# Patient Record
Sex: MALE | HISPANIC OR LATINO | Employment: FULL TIME | URBAN - METROPOLITAN AREA
[De-identification: names, ages, dates, MRNs, and addresses within clinical notes are randomized per-mention and may not be internally consistent; named-entity substitution may affect disease eponyms.]

---

## 2020-02-26 PROBLEM — H81.10 BENIGN PAROXYSMAL POSITIONAL VERTIGO, UNSPECIFIED LATERALITY: Status: ACTIVE | Noted: 2020-02-26

## 2022-10-27 ENCOUNTER — OFFICE VISIT (OUTPATIENT)
Dept: INTERNAL MEDICINE | Age: 42
End: 2022-10-27

## 2022-10-27 VITALS
RESPIRATION RATE: 16 BRPM | HEIGHT: 70 IN | OXYGEN SATURATION: 97 % | SYSTOLIC BLOOD PRESSURE: 133 MMHG | HEART RATE: 96 BPM | BODY MASS INDEX: 30.37 KG/M2 | WEIGHT: 212.1 LBS | DIASTOLIC BLOOD PRESSURE: 86 MMHG | TEMPERATURE: 98.1 F

## 2022-10-27 DIAGNOSIS — E66.9 OBESITY (BMI 30.0-34.9): ICD-10-CM

## 2022-10-27 DIAGNOSIS — Z23 NEED FOR VACCINATION: ICD-10-CM

## 2022-10-27 DIAGNOSIS — D72.819 LEUKOPENIA, UNSPECIFIED TYPE: ICD-10-CM

## 2022-10-27 DIAGNOSIS — Z00.00 ANNUAL PHYSICAL EXAM: Primary | ICD-10-CM

## 2022-10-27 DIAGNOSIS — E78.2 HYPERLIPIDEMIA, MIXED: ICD-10-CM

## 2022-10-27 PROCEDURE — 99386 PREV VISIT NEW AGE 40-64: CPT | Performed by: INTERNAL MEDICINE

## 2022-10-27 PROCEDURE — 90471 IMMUNIZATION ADMIN: CPT | Performed by: INTERNAL MEDICINE

## 2022-10-27 PROCEDURE — 90686 IIV4 VACC NO PRSV 0.5 ML IM: CPT | Performed by: INTERNAL MEDICINE

## 2022-10-27 ASSESSMENT — ENCOUNTER SYMPTOMS
LIGHT-HEADEDNESS: 0
ABDOMINAL PAIN: 0
NERVOUS/ANXIOUS: 0
CONSTIPATION: 0
SHORTNESS OF BREATH: 0
DIARRHEA: 0
NAUSEA: 0
FATIGUE: 0
COUGH: 0
WHEEZING: 0
EYE DISCHARGE: 0
ABDOMINAL DISTENTION: 0
COLOR CHANGE: 0
TROUBLE SWALLOWING: 0
WEAKNESS: 0
SORE THROAT: 0
HEADACHES: 0
NUMBNESS: 0
EYE PAIN: 0
UNEXPECTED WEIGHT CHANGE: 0
CHEST TIGHTNESS: 0
SINUS PRESSURE: 0
APPETITE CHANGE: 0
FEVER: 0
DIZZINESS: 0
EYE REDNESS: 0
BACK PAIN: 0
BLOOD IN STOOL: 0

## 2022-10-27 ASSESSMENT — PATIENT HEALTH QUESTIONNAIRE - PHQ9
CLINICAL INTERPRETATION OF PHQ2 SCORE: NO FURTHER SCREENING NEEDED
SUM OF ALL RESPONSES TO PHQ9 QUESTIONS 1 AND 2: 0
SUM OF ALL RESPONSES TO PHQ9 QUESTIONS 1 AND 2: 0
2. FEELING DOWN, DEPRESSED OR HOPELESS: NOT AT ALL
1. LITTLE INTEREST OR PLEASURE IN DOING THINGS: NOT AT ALL

## 2022-10-29 ENCOUNTER — LAB SERVICES (OUTPATIENT)
Dept: LAB | Age: 42
End: 2022-10-29

## 2022-10-29 DIAGNOSIS — E66.9 OBESITY (BMI 30.0-34.9): ICD-10-CM

## 2022-10-29 DIAGNOSIS — Z00.00 ANNUAL PHYSICAL EXAM: ICD-10-CM

## 2022-10-29 LAB
ALBUMIN SERPL-MCNC: 4.5 G/DL (ref 3.6–5.1)
ALBUMIN/GLOB SERPL: 1.7 {RATIO} (ref 1–2.4)
ALP SERPL-CCNC: 51 UNITS/L (ref 45–117)
ALT SERPL-CCNC: 64 UNITS/L
ANION GAP SERPL CALC-SCNC: 7 MMOL/L (ref 7–19)
AST SERPL-CCNC: 28 UNITS/L
BASOPHILS # BLD: 0 K/MCL (ref 0–0.3)
BASOPHILS NFR BLD: 1 %
BILIRUB SERPL-MCNC: 1.3 MG/DL (ref 0.2–1)
BUN SERPL-MCNC: 18 MG/DL (ref 6–20)
BUN/CREAT SERPL: 16 (ref 7–25)
CALCIUM SERPL-MCNC: 9 MG/DL (ref 8.4–10.2)
CHLORIDE SERPL-SCNC: 105 MMOL/L (ref 97–110)
CHOLEST SERPL-MCNC: 200 MG/DL
CHOLEST/HDLC SERPL: 4.8 {RATIO}
CO2 SERPL-SCNC: 31 MMOL/L (ref 21–32)
CREAT SERPL-MCNC: 1.11 MG/DL (ref 0.67–1.17)
DEPRECATED RDW RBC: 40.4 FL (ref 39–50)
EOSINOPHIL # BLD: 0.2 K/MCL (ref 0–0.5)
EOSINOPHIL NFR BLD: 5 %
ERYTHROCYTE [DISTWIDTH] IN BLOOD: 12.2 % (ref 11–15)
FASTING DURATION TIME PATIENT: ABNORMAL H
FASTING DURATION TIME PATIENT: ABNORMAL H
GFR SERPLBLD BASED ON 1.73 SQ M-ARVRAT: 85 ML/MIN
GLOBULIN SER-MCNC: 2.7 G/DL (ref 2–4)
GLUCOSE SERPL-MCNC: 95 MG/DL (ref 70–99)
HCT VFR BLD CALC: 46.9 % (ref 39–51)
HDLC SERPL-MCNC: 42 MG/DL
HGB BLD-MCNC: 16.3 G/DL (ref 13–17)
IMM GRANULOCYTES # BLD AUTO: 0 K/MCL (ref 0–0.2)
IMM GRANULOCYTES # BLD: 0 %
LDLC SERPL CALC-MCNC: 137 MG/DL
LYMPHOCYTES # BLD: 1.2 K/MCL (ref 1–4.8)
LYMPHOCYTES NFR BLD: 31 %
MCH RBC QN AUTO: 31.3 PG (ref 26–34)
MCHC RBC AUTO-ENTMCNC: 34.8 G/DL (ref 32–36.5)
MCV RBC AUTO: 90.2 FL (ref 78–100)
MONOCYTES # BLD: 0.4 K/MCL (ref 0.3–0.9)
MONOCYTES NFR BLD: 11 %
NEUTROPHILS # BLD: 2 K/MCL (ref 1.8–7.7)
NEUTROPHILS NFR BLD: 52 %
NONHDLC SERPL-MCNC: 158 MG/DL
NRBC BLD MANUAL-RTO: 0 /100 WBC
PLATELET # BLD AUTO: 205 K/MCL (ref 140–450)
POTASSIUM SERPL-SCNC: 4.3 MMOL/L (ref 3.4–5.1)
PROT SERPL-MCNC: 7.2 G/DL (ref 6.4–8.2)
RBC # BLD: 5.2 MIL/MCL (ref 4.5–5.9)
SODIUM SERPL-SCNC: 139 MMOL/L (ref 135–145)
TRIGL SERPL-MCNC: 107 MG/DL
TSH SERPL-ACNC: 1.83 MCUNITS/ML (ref 0.35–5)
WBC # BLD: 3.7 K/MCL (ref 4.2–11)

## 2022-10-29 PROCEDURE — 36415 COLL VENOUS BLD VENIPUNCTURE: CPT | Performed by: INTERNAL MEDICINE

## 2022-10-29 PROCEDURE — 80061 LIPID PANEL: CPT | Performed by: INTERNAL MEDICINE

## 2022-10-29 PROCEDURE — 80050 GENERAL HEALTH PANEL: CPT | Performed by: INTERNAL MEDICINE

## 2022-10-31 PROBLEM — D72.819 LEUKOPENIA: Status: ACTIVE | Noted: 2022-10-31

## 2022-10-31 PROBLEM — E78.2 HYPERLIPIDEMIA, MIXED: Status: ACTIVE | Noted: 2022-10-31

## 2024-02-09 ENCOUNTER — TELEPHONE (OUTPATIENT)
Dept: FAMILY MEDICINE CLINIC | Facility: CLINIC | Age: 44
End: 2024-02-09

## 2024-02-09 ENCOUNTER — LAB ENCOUNTER (OUTPATIENT)
Dept: LAB | Age: 44
End: 2024-02-09
Attending: STUDENT IN AN ORGANIZED HEALTH CARE EDUCATION/TRAINING PROGRAM
Payer: COMMERCIAL

## 2024-02-09 ENCOUNTER — OFFICE VISIT (OUTPATIENT)
Dept: FAMILY MEDICINE CLINIC | Facility: CLINIC | Age: 44
End: 2024-02-09
Payer: COMMERCIAL

## 2024-02-09 VITALS
SYSTOLIC BLOOD PRESSURE: 118 MMHG | TEMPERATURE: 97 F | DIASTOLIC BLOOD PRESSURE: 80 MMHG | HEIGHT: 70.28 IN | RESPIRATION RATE: 16 BRPM | WEIGHT: 214.38 LBS | HEART RATE: 66 BPM | BODY MASS INDEX: 30.35 KG/M2

## 2024-02-09 DIAGNOSIS — Z13.220 SCREENING FOR LIPID DISORDERS: ICD-10-CM

## 2024-02-09 DIAGNOSIS — Z12.5 SCREENING FOR PROSTATE CANCER: ICD-10-CM

## 2024-02-09 DIAGNOSIS — Z00.01 ENCOUNTER FOR ROUTINE ADULT HEALTH EXAMINATION WITH ABNORMAL FINDINGS: Primary | ICD-10-CM

## 2024-02-09 DIAGNOSIS — Z13.89 SCREENING FOR GENITOURINARY CONDITION: ICD-10-CM

## 2024-02-09 DIAGNOSIS — Z12.11 SCREENING FOR MALIGNANT NEOPLASM OF COLON: ICD-10-CM

## 2024-02-09 DIAGNOSIS — Z00.00 LABORATORY EXAMINATION ORDERED AS PART OF A ROUTINE GENERAL MEDICAL EXAMINATION: ICD-10-CM

## 2024-02-09 DIAGNOSIS — Z13.29 SCREENING FOR ENDOCRINE, METABOLIC, AND IMMUNITY DISORDER: ICD-10-CM

## 2024-02-09 DIAGNOSIS — E66.09 CLASS 1 OBESITY DUE TO EXCESS CALORIES WITH BODY MASS INDEX (BMI) OF 30.0 TO 30.9 IN ADULT, UNSPECIFIED WHETHER SERIOUS COMORBIDITY PRESENT: ICD-10-CM

## 2024-02-09 DIAGNOSIS — Z13.0 SCREENING FOR ENDOCRINE, METABOLIC, AND IMMUNITY DISORDER: ICD-10-CM

## 2024-02-09 DIAGNOSIS — Z13.228 SCREENING FOR ENDOCRINE, METABOLIC, AND IMMUNITY DISORDER: ICD-10-CM

## 2024-02-09 DIAGNOSIS — M21.612 BUNION OF LEFT FOOT: ICD-10-CM

## 2024-02-09 PROBLEM — M79.672 FOOT PAIN, LEFT: Status: ACTIVE | Noted: 2023-11-02

## 2024-02-09 PROBLEM — J30.9 ALLERGIC RHINITIS: Status: ACTIVE | Noted: 2018-02-27

## 2024-02-09 PROBLEM — L60.8 TOENAIL DEFORMITY: Status: ACTIVE | Noted: 2018-03-16

## 2024-02-09 PROBLEM — E78.2 HYPERLIPIDEMIA, MIXED: Status: ACTIVE | Noted: 2022-10-31

## 2024-02-09 PROBLEM — E66.9 OBESITY (BMI 30.0-34.9): Status: ACTIVE | Noted: 2018-02-27

## 2024-02-09 PROBLEM — D72.819 LEUKOPENIA: Status: ACTIVE | Noted: 2022-10-31

## 2024-02-09 PROBLEM — G89.29 CHRONIC LOW BACK PAIN: Status: ACTIVE | Noted: 2018-02-27

## 2024-02-09 PROBLEM — E66.811 OBESITY (BMI 30.0-34.9): Status: ACTIVE | Noted: 2018-02-27

## 2024-02-09 PROBLEM — M54.50 CHRONIC LOW BACK PAIN: Status: ACTIVE | Noted: 2018-02-27

## 2024-02-09 PROBLEM — M25.512 ACUTE PAIN OF LEFT SHOULDER: Status: ACTIVE | Noted: 2022-08-12

## 2024-02-09 PROBLEM — E55.9 VITAMIN D DEFICIENCY: Status: ACTIVE | Noted: 2018-03-16

## 2024-02-09 LAB
ALBUMIN SERPL-MCNC: 4.3 G/DL (ref 3.4–5)
ALBUMIN/GLOB SERPL: 1.4 {RATIO} (ref 1–2)
ALP LIVER SERPL-CCNC: 63 U/L
ALT SERPL-CCNC: 40 U/L
ANION GAP SERPL CALC-SCNC: 4 MMOL/L (ref 0–18)
AST SERPL-CCNC: 28 U/L (ref 15–37)
BASOPHILS # BLD AUTO: 0.02 X10(3) UL (ref 0–0.2)
BASOPHILS NFR BLD AUTO: 0.3 %
BILIRUB SERPL-MCNC: 0.8 MG/DL (ref 0.1–2)
BILIRUB UR QL STRIP.AUTO: NEGATIVE
BUN BLD-MCNC: 18 MG/DL (ref 9–23)
CALCIUM BLD-MCNC: 8.9 MG/DL (ref 8.5–10.1)
CHLORIDE SERPL-SCNC: 105 MMOL/L (ref 98–112)
CHOLEST SERPL-MCNC: 198 MG/DL (ref ?–200)
CLARITY UR REFRACT.AUTO: CLEAR
CO2 SERPL-SCNC: 30 MMOL/L (ref 21–32)
COLOR UR AUTO: YELLOW
COMPLEXED PSA SERPL-MCNC: 0.83 NG/ML (ref ?–4)
CREAT BLD-MCNC: 1.2 MG/DL
EGFRCR SERPLBLD CKD-EPI 2021: 76 ML/MIN/1.73M2 (ref 60–?)
EOSINOPHIL # BLD AUTO: 0.21 X10(3) UL (ref 0–0.7)
EOSINOPHIL NFR BLD AUTO: 3.5 %
ERYTHROCYTE [DISTWIDTH] IN BLOOD BY AUTOMATED COUNT: 11.8 %
FASTING PATIENT LIPID ANSWER: NO
FASTING STATUS PATIENT QL REPORTED: NO
GLOBULIN PLAS-MCNC: 3 G/DL (ref 2.8–4.4)
GLUCOSE BLD-MCNC: 86 MG/DL (ref 70–99)
GLUCOSE UR STRIP.AUTO-MCNC: NEGATIVE MG/DL
HCT VFR BLD AUTO: 44.8 %
HDLC SERPL-MCNC: 46 MG/DL (ref 40–59)
HGB BLD-MCNC: 15.4 G/DL
IMM GRANULOCYTES # BLD AUTO: 0.01 X10(3) UL (ref 0–1)
IMM GRANULOCYTES NFR BLD: 0.2 %
KETONES UR STRIP.AUTO-MCNC: NEGATIVE MG/DL
LDLC SERPL CALC-MCNC: 104 MG/DL (ref ?–100)
LEUKOCYTE ESTERASE UR QL STRIP.AUTO: NEGATIVE
LYMPHOCYTES # BLD AUTO: 1.65 X10(3) UL (ref 1–4)
LYMPHOCYTES NFR BLD AUTO: 27.8 %
MCH RBC QN AUTO: 30.3 PG (ref 26–34)
MCHC RBC AUTO-ENTMCNC: 34.4 G/DL (ref 31–37)
MCV RBC AUTO: 88.2 FL
MONOCYTES # BLD AUTO: 0.52 X10(3) UL (ref 0.1–1)
MONOCYTES NFR BLD AUTO: 8.8 %
NEUTROPHILS # BLD AUTO: 3.52 X10 (3) UL (ref 1.5–7.7)
NEUTROPHILS # BLD AUTO: 3.52 X10(3) UL (ref 1.5–7.7)
NEUTROPHILS NFR BLD AUTO: 59.4 %
NITRITE UR QL STRIP.AUTO: NEGATIVE
NONHDLC SERPL-MCNC: 152 MG/DL (ref ?–130)
OSMOLALITY SERPL CALC.SUM OF ELEC: 289 MOSM/KG (ref 275–295)
PH UR STRIP.AUTO: 7.5 [PH] (ref 5–8)
PLATELET # BLD AUTO: 201 10(3)UL (ref 150–450)
POTASSIUM SERPL-SCNC: 4.1 MMOL/L (ref 3.5–5.1)
PROT SERPL-MCNC: 7.3 G/DL (ref 6.4–8.2)
PROT UR STRIP.AUTO-MCNC: NEGATIVE MG/DL
RBC # BLD AUTO: 5.08 X10(6)UL
RBC UR QL AUTO: NEGATIVE
SODIUM SERPL-SCNC: 139 MMOL/L (ref 136–145)
SP GR UR STRIP.AUTO: 1.02 (ref 1–1.03)
TRIGL SERPL-MCNC: 282 MG/DL (ref 30–149)
TSI SER-ACNC: 2.47 MIU/ML (ref 0.36–3.74)
UROBILINOGEN UR STRIP.AUTO-MCNC: 0.2 MG/DL
VLDLC SERPL CALC-MCNC: 48 MG/DL (ref 0–30)
WBC # BLD AUTO: 5.9 X10(3) UL (ref 4–11)

## 2024-02-09 PROCEDURE — 80061 LIPID PANEL: CPT

## 2024-02-09 PROCEDURE — 81003 URINALYSIS AUTO W/O SCOPE: CPT

## 2024-02-09 PROCEDURE — 85025 COMPLETE CBC W/AUTO DIFF WBC: CPT

## 2024-02-09 PROCEDURE — 84443 ASSAY THYROID STIM HORMONE: CPT

## 2024-02-09 PROCEDURE — 80053 COMPREHEN METABOLIC PANEL: CPT

## 2024-02-09 PROCEDURE — 99386 PREV VISIT NEW AGE 40-64: CPT | Performed by: STUDENT IN AN ORGANIZED HEALTH CARE EDUCATION/TRAINING PROGRAM

## 2024-02-09 NOTE — PATIENT INSTRUCTIONS
The ultrafast heart scan also called the calcium score is another tool to determine risk for heart disease. It is a low dose CT scan that looks at calcium deposits in the coronary arteries. You can call [phone] or go online [email or web address] to schedule.

## 2024-02-09 NOTE — PROGRESS NOTES
[facility]  02/09/24    Chief Complaint   Patient presents with    Physical     HPI:   John Abraham is a 43 year old male who presents for a complete physical exam.    Will have foot surgery.    After father passed away 5 years ago, was drinking a lot and then tried cannabis for sleep and it works well.     Had RSV a few months ago.    Fingers will be dry sometimes.    PMH: denied  PSH: denied  Meds: denied  Allergies: PCN and shrimp  Home: lives with wife, son is away at college in [place]  Work: , enjoys job  Habits: Denied alcohol, tobacco, +cannabis for sleep  FHx: diabetes, stroke, heart issues on father's side; mother's side anxiety  Exercise: daily, playing [employer] ball three times a week   Diet: healthy  Immunizations: vaccines up to date        Wt Readings from Last 6 Encounters:   02/09/24 214 lb 6.4 oz (97.3 kg)   02/26/20 220 lb (99.8 kg)     Body mass index is 30.52 kg/m².     Results for orders placed or performed in visit on 02/29/20   Vitamin D, 25-Hydroxy [1516021]   Result Value Ref Range    25-Hydroxyvitamin D (Total) 13.36 (L) 30.00 - 100.00 ng/mL   Hemoglobin A1C [[id number]]   Result Value Ref Range    HbA1c 5.0 4.0 - 5.6 %    Estimated Average Glucose 97 mg/dL   PSA   Result Value Ref Range    PSA 0.644 0.01 - 4 ng/mL   TSH W Reflex to Free T4 [[id number]]   Result Value Ref Range    TSH 1.792 0.350 - 5.500 mIU/L   Lipid Panel   Result Value Ref Range    Patient Fasting? Yes     Triglycerides 100.00 <150.00 mg/dL    Direct HDL 38 (L) >=40 mg/dL    Risk Factor 4.9 <5.0    Cholesterol 187.00 <=200.00 mg/dL    Calculated  <130 mg/dL   ALT(SGPT)   Result Value Ref Range    ALT 84 (H) 17 - 63 U/L   Basic Metabolic Panel (8)   Result Value Ref Range    Patient Fasting? Yes     Sodium 140 136 - 144 mmol/L    Potassium 3.80 3.60 - 5.10 mmol/L    Chloride 102 101 - 111 mmol/L    Carbon Dioxide 30.7 22.0 - 32.0 mmol/L    Blood Urea Nitrogen 12.0 8.0 - 20.0 mg/dL     Creatinine 1.06 0.70 - 1.30 mg/dL    Glucose 90 70 - 99 mg/dL    Calcium 8.8 8.3 - 10.3 mg/dL    GFR CKD-EPI 87.35 >=60.00 mL/min/1.73 m²   CBC, Platelet; No Differential   Result Value Ref Range    WBC 3.52 (L) 4.00 - 13.00 10^3/uL    RBC 5.18 4.30 - 5.70 10^6/uL    Hemoglobin 16.2 13.0 - 17.0 g/dL    Hematocrit 45.6 37.0 - 53.0 %    MCV 88.0 80.0 - 99.0 fL    MCH 31.3 27.0 - 33.2 pg    MCHC 35.5 31.0 - 37.0 g/dL    Platelet Count 186 150 - 450 10^3/uL    RDW 11.7 11.5 - 16.0 %    MPV 9.4 7.0 - 11.5 fL   Vitamin B12   Result Value Ref Range    Vitamin B12 545 211 - 946 pg/mL   Folic Acid Serum (Folate)   Result Value Ref Range    Folate (Folic Acid) 13.39 4.78 - 24.20 ng/mL         No current outpatient medications on file.      Allergies   Allergen Reactions    Penicillins SWELLING and SHORTNESS OF BREATH    Shrimp Extract Allergy Skin Test HIVES      Past Medical History:   Diagnosis Date    Family history of diabetes mellitus     Family history of heart disease     History of motion sickness       History reviewed. No pertinent surgical history.   Family History   Problem Relation Age of Onset    Diabetes Father     Heart Disease Father     Stroke Father       Social History:  Social History     Socioeconomic History    Marital status: Unknown   Tobacco Use    Smoking status: Never    Smokeless tobacco: Never   Vaping Use    Vaping Use: Never used   Substance and Sexual Activity    Alcohol use: Not Currently     Comment: stopped drinking 07/05/19    Drug use: Yes     Types: Cannabis   Other Topics Concern    Caffeine Concern Yes     Comment: 2 c. coffee/daily    Exercise Yes     Comment: strength, pickle ball, cardio         REVIEW OF SYSTEMS:   GENERAL: feels well otherwise  SKIN: denies any unusual skin lesions  EYES:denies blurred vision or double vision  HEENT: denies nasal congestion, sinus pain or ST  LUNGS: denies shortness of breath with exertion, denies cough  CARDIOVASCULAR: denies chest pain on  exertion or at rest, denies palpitations  GI: denies abdominal pain,denies heartburn, denies n/v/d/c/blood in stool  : + 1 nocturia, denies changes in stream  MUSCULOSKELETAL: +chronic back pain  NEURO: denies headaches, denies LH/dizziness/syncope  PSYCHE: denies depression or anxiety  HEMATOLOGIC: denies hx of anemia  ENDOCRINE: denies thyroid history  ALL/ASTHMA: + hx of allergy or asthma    EXAM:   /80 (BP Location: Left arm, Patient Position: Sitting, Cuff Size: large)   Pulse 66   Temp 97 °F (36.1 °C) (Temporal)   Resp 16   Ht 5' 10.28\" (1.785 m)   Wt 214 lb 6.4 oz (97.3 kg)   BMI 30.52 kg/m²   Body mass index is 30.52 kg/m².   GENERAL: well developed, well nourished,in no apparent distress  SKIN: no rashes,no suspicious lesions  HEENT: atraumatic, normocephalic,ears and throat are clear  EYES:PERRLA, EOMI, conjunctiva are clear  NECK: supple,no adenopathy,no bruits, no thyromegaly  LUNGS: clear to auscultation, no r/r/w  CARDIO: RRR without murmur  GI: good BS's,no masses, HSM or tenderness  :  two descended testes, no masses, no hernia, no penile lesions  RECTAL: good rectal tone, prostate shows no masses, stool is OB negative  MUSCULOSKELETAL: back is not tender, FROM of the back  EXTREMITIES: no cyanosis, clubbing or edema  NEURO: Oriented times three,cranial nerves are intact,motor and sensory are grossly intact; 2+ knee reflexes bilaterally  VASCULAR: 2+ posterior tibialis pulses bilaterally    ASSESSMENT AND PLAN:   John Abraham is a 43 year old male who presents for a complete physical exam.     - BP: at goal   - BMI: Body mass index is 30.52 kg/m²., c/w obesity, recommended low fat diet and aerobic exercise 30 minutes at least three times weekly and resistance training at least two times weekly.        The patient indicates understanding of these issues and agrees to the plan.    Health maintenance, will check:   Orders Placed This Encounter   Procedures    Occult Blood, Fecal,  Immunoassay (Blue cards) [E]    CBC With Differential With Platelet    Comp Metabolic Panel (14)    Lipid Panel    TSH W Reflex To Free T4    Urinalysis with Culture Reflex    PSA, Total (Screening) [E]           Encounter Diagnoses   Name Primary?    Encounter for routine adult health examination with abnormal findings Yes    Class 1 obesity due to excess calories with body mass index (BMI) of 30.0 to 30.9 in adult, unspecified whether serious comorbidity present     Screening for malignant neoplasm of colon     Screening for prostate cancer     Laboratory examination ordered as part of a routine general medical examination     Screening for endocrine, metabolic, and immunity disorder     Screening for lipid disorders     Screening for genitourinary condition     Bunion of left foot        Meds & Refills for this Visit:  Requested Prescriptions      No prescriptions requested or ordered in this encounter       Imaging & Consults:  None      Return in about 1 year (around 2/9/2025) for yearly physical or sooner if needed.      Bijal Jimenez MD  [facility]  02/09/24

## 2024-02-09 NOTE — TELEPHONE ENCOUNTER
Received a medical clearance from [facility]   Called and lvm for pt to call back to verify if he will like to continue his care with

## 2024-04-01 ENCOUNTER — TELEPHONE (OUTPATIENT)
Dept: FAMILY MEDICINE CLINIC | Facility: CLINIC | Age: 44
End: 2024-04-01

## 2024-04-01 ENCOUNTER — OFFICE VISIT (OUTPATIENT)
Dept: FAMILY MEDICINE CLINIC | Facility: CLINIC | Age: 44
End: 2024-04-01
Payer: COMMERCIAL

## 2024-04-01 VITALS
HEART RATE: 78 BPM | HEIGHT: 70.28 IN | RESPIRATION RATE: 18 BRPM | DIASTOLIC BLOOD PRESSURE: 82 MMHG | SYSTOLIC BLOOD PRESSURE: 120 MMHG | TEMPERATURE: 97 F | BODY MASS INDEX: 30.24 KG/M2 | WEIGHT: 213.63 LBS

## 2024-04-01 DIAGNOSIS — H65.93 FLUID LEVEL BEHIND TYMPANIC MEMBRANE OF BOTH EARS: ICD-10-CM

## 2024-04-01 DIAGNOSIS — M21.612 BUNION OF LEFT FOOT: ICD-10-CM

## 2024-04-01 DIAGNOSIS — Z01.818 PREOPERATIVE EXAMINATION: Primary | ICD-10-CM

## 2024-04-01 PROCEDURE — 99214 OFFICE O/P EST MOD 30 MIN: CPT | Performed by: STUDENT IN AN ORGANIZED HEALTH CARE EDUCATION/TRAINING PROGRAM

## 2024-04-01 NOTE — H&P
History and Physical  [facility]  04/01/24    Chief Complaint   Patient presents with    Pre-Op Exam     Bunionectomy with osteotomy left foot, extensor hallucis longus first digit left foot     John Abraham is a 43 year old male with a hx of bunion of left foot, who presents for a pre-operative physical exam at the request of Dr. Ambrosio Isaac. Patient is to have bunionectomy with osteotomy left foot, extensor hallucis longus first digit left foot , to by done by Dr. Isaac at [facility] on 4/4/24.     Has postop scheduled.     Was traveling and had some stuffiness. No fevers. Some seasonal allergies for the past few weeks.     Cardiac history: denied  Anesthesia history: never had before    Allergies:  Allergies   Allergen Reactions    Penicillins SWELLING and SHORTNESS OF BREATH    Shrimp Extract Allergy Skin Test HIVES       No current outpatient medications on file.     Past Medical History:   Diagnosis Date    Family history of diabetes mellitus     Family history of heart disease     History of motion sickness      History reviewed. No pertinent surgical history.  Family History   Problem Relation Age of Onset    Diabetes Father     Heart Disease Father     Stroke Father      Social History     Socioeconomic History    Marital status:    Tobacco Use    Smoking status: Never    Smokeless tobacco: Never   Vaping Use    Vaping Use: Never used   Substance and Sexual Activity    Alcohol use: Not Currently     Comment: stopped drinking 07/05/19    Drug use: Yes     Types: Cannabis   Other Topics Concern    Caffeine Concern Yes     Comment: 2 c. coffee/daily    Exercise Yes     Comment: strength, pickle ball, cardio         REVIEW OF SYSTEMS:  GENERAL: feels well otherwise and denies fever  SKIN: denies any unusual skin lesions and denies rashes  EYES:denies blurred vision or double vision  HEENT: denies nasal congestion, sinus pain or ST  LUNGS: denies shortness of breath  with exertion or cough  CARDIOVASCULAR: denies chest pain on exertion  GI: denies abdominal pain,denies heartburn  : --- denies dysuria  MUSCULOSKELETAL: denies back pain  NEURO: denies headaches  PSYCHE: denies depression or anxiety  HEMATOLOGIC: denies hx of anemia  ENDOCRINE: denies thyroid history  ALL/ASTHMA: hx of allergies or penicillin and shrimp    EXAM:  /82   Pulse 78   Temp 97.3 °F (36.3 °C) (Temporal)   Resp 18   Ht 5' 10.28\" (1.785 m)   Wt 213 lb 9.6 oz (96.9 kg)   BMI 30.40 kg/m²   GENERAL: well developed, well nourished,in no apparent distress  SKIN: no rashes,no suspicious lesions  HEENT: atraumatic, normocephalic,ears and throat are clear  EYES:PERRLA, EOMI, conjunctiva are clear  NECK: supple and no adenopathy  BREAST: deferred  LUNGS: clear to auscultation  CARDIO: RRR without murmur  GI: good BS's,no masses, HSM or tenderness  : deferred  RECTAL: deferred  MUSCULOSKELETAL: back is not tender,FROM of the back  EXTREMITIES: no cyanosis, clubbing or edema  NEURO: Oriented times three,cranial nerves are intact,motor and sensory are grossly intact      ASSESSMENT AND PLAN:  John Abraham is a 43 year old male, with a hx of left bunion, who presents for a pre-operative  physical exam. Patient is to have bunionectomy, to by done by Dr. Ambrosio Isaac at [facility] on 4/4/24. Pt has the following conditions: mixed HLD not on medications. Pt has no significant history of cardiac or pulmonary conditions. Patient is medically optimized for the planned procedure. Patient has trace fluid in the ear, can try zyrtec. No s/s of infection at this time.        Thank you,    Bijal Jimenez MD  [facility]  04/01/24    [facility], [address] [place] [id number]  Dept: [phone]  Dept Fax: [phone]

## 2025-07-15 ENCOUNTER — OFFICE VISIT (OUTPATIENT)
Dept: PAIN CLINIC | Facility: CLINIC | Age: 45
End: 2025-07-15
Payer: COMMERCIAL

## 2025-07-15 DIAGNOSIS — M25.511 RIGHT SHOULDER PAIN, UNSPECIFIED CHRONICITY: ICD-10-CM

## 2025-07-15 DIAGNOSIS — M25.521 RIGHT ELBOW PAIN: Primary | ICD-10-CM

## 2025-07-15 PROCEDURE — 97811 ACUP 1/> W/O ESTIM EA ADD 15: CPT | Performed by: ACUPUNCTURIST

## 2025-07-15 PROCEDURE — 97810 ACUP 1/> WO ESTIM 1ST 15 MIN: CPT | Performed by: ACUPUNCTURIST

## 2025-07-15 NOTE — PROGRESS NOTES
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   Chief Complaint: RT elbow pain  Secondary Complaint: anxiety and stress    Self reported health status:     Patient reported severity of symptom(s) over the last 24 hours  With zero reporting no symptoms and 10 reporting the worst severity    Symptom 1:2  Symptom 2: 5    Response to last TX: NA    HPI: John suffers from RT elbow pain diagnosed as epicondylitis. He has been in PT 2 times for about a year and it has improved significantly but he would like to resolve it.  He noticed that the pain had become worse after his last few appointments with Pt.  Once he rested it then it improved.    He has been suffering from high anxiety, stress and poor sleep since his father's death nearly 6 years ago. He finds that he tends to ruminate on things and worry excessively.  The dominate emotion is fear and frustration.  He finds that working out helps.  He tends to sigh and will periodically have ribside pain.    Additionally, he recently started having mild shoulder pain.    We discussed trying to take a Lance ji class.    Objective (Pulse, Tongue, Vitals): Pulse is mid level and wiry in reuben.  Tong is red at tip.    TCM Diagnosis/Assessment: Channel obstruction with LR ping    Plan of Care:  Name of Herbal Formula:None  Dosage:NA  Duration: NA    Acupuncture Therapy:  1st set: Bilateral kilpatrick men,   2nd set: RT LI 11, 10, LG 5, HT 3(ah ping point), LT ST 38, GB 34  3rd set: RT LR 3, 8, LT PC 6    Patient Goals: Resolve elbow pain and reduce anxiety by 50%    Face Time: 38 minutes  Total Time: 60 minutes    Plan 2 tx per week for 1 month and re-evaluate

## 2025-07-22 ENCOUNTER — OFFICE VISIT (OUTPATIENT)
Dept: PAIN CLINIC | Facility: CLINIC | Age: 45
End: 2025-07-22
Payer: COMMERCIAL

## 2025-07-22 DIAGNOSIS — M25.521 RIGHT ELBOW PAIN: Primary | ICD-10-CM

## 2025-07-22 PROCEDURE — 97811 ACUP 1/> W/O ESTIM EA ADD 15: CPT | Performed by: ACUPUNCTURIST

## 2025-07-22 PROCEDURE — 97810 ACUP 1/> WO ESTIM 1ST 15 MIN: CPT | Performed by: ACUPUNCTURIST

## 2025-07-22 NOTE — PROGRESS NOTES
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   Chief Complaint: RT elbow pain  Secondary Complaint: anxiety and stress    Self reported health status:     Patient reported severity of symptom(s) over the last 24 hours  With zero reporting no symptoms and 10 reporting the worst severity    Symptom 1: 2  Symptom 2: 4    Response to last TX: John did not notice a significant change in his elbow pain after the last treatment.    Directly after the treatment, he noticed an increase in anger and then it went away after an hour and he felt more relaxed.  We discussed how this can happen after the first 1-2 tx.    Today he is also experiencing some RT shoulder pain.    Herbs are a consideration for next tx.    He is planning to try a Lance ji class.    HPI 7/15/25: John suffers from RT elbow pain diagnosed as epicondylitis. He has been in PT 2 times for about a year and it has improved significantly but he would like to resolve it.  He noticed that the pain had become worse after his last few appointments with Pt.  Once he rested it then it improved.    He has been suffering from high anxiety, stress and poor sleep since his father's death nearly 6 years ago. He finds that he tends to ruminate on things and worry excessively.  The dominate emotion is fear and frustration.  He finds that working out helps.  He tends to sigh and will periodically have ribside pain.    Additionally, he recently started having mild shoulder pain.    We discussed trying to take a Lance ji class.    Objective (Pulse, Tongue, Vitals): Pulse is mid level and wiry in reuben.  Tong is red at tip.    TCM Diagnosis/Assessment: Channel obstruction with LR ping    Plan of Care:  Name of Herbal Formula:None  Dosage:NA  Duration: NA    Acupuncture Therapy:  1st set: Bilateral kilpatrick men, yin luo  2nd set: RT SJ 5- SJ 11 estim,  LT ST 38, GB 34  3rd set: RT LR 3, 8, LT PC 6    Patient Goals: Resolve elbow pain and reduce anxiety by 50%    Face Time:  28 minutes  Total Time: 50 minutes    Plan 2 tx per week for 1 month and re-evaluate

## 2025-07-24 ENCOUNTER — OFFICE VISIT (OUTPATIENT)
Dept: INTEGRATIVE MEDICINE | Facility: CLINIC | Age: 45
End: 2025-07-24
Payer: COMMERCIAL

## 2025-07-24 DIAGNOSIS — M25.521 RIGHT ELBOW PAIN: Primary | ICD-10-CM

## 2025-07-24 PROCEDURE — 97811 ACUP 1/> W/O ESTIM EA ADD 15: CPT | Performed by: ACUPUNCTURIST

## 2025-07-24 PROCEDURE — 97810 ACUP 1/> WO ESTIM 1ST 15 MIN: CPT | Performed by: ACUPUNCTURIST

## 2025-07-24 NOTE — PROGRESS NOTES
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   Chief Complaint: RT elbow pain  Secondary Complaint: anxiety and stress    Self reported health status:     Patient reported severity of symptom(s) over the last 24 hours  With zero reporting no symptoms and 10 reporting the worst severity    Symptom 1: 1-2  Symptom 2: 4    Response to last TX: John noticed a decrease in his elbow discomfort by possibly 20-30% and is pleased with progress.    His anger and stress are present at a lower level.    RT shoulder pain has resolved.    Herbs are a consideration for the future.    He is planning to try a Lance ji class.    HPI 7/15/25: John suffers from RT elbow pain diagnosed as epicondylitis. He has been in PT 2 times for about a year and it has improved significantly but he would like to resolve it.  He noticed that the pain had become worse after his last few appointments with Pt.  Once he rested it then it improved.    He has been suffering from high anxiety, stress and poor sleep since his father's death nearly 6 years ago. He finds that he tends to ruminate on things and worry excessively.  The dominate emotion is fear and frustration.  He finds that working out helps.  He tends to sigh and will periodically have ribside pain.    Additionally, he recently started having mild shoulder pain.    We discussed trying to take a Lance ji class.    Objective (Pulse, Tongue, Vitals): Pulse is mid level and wiry in reuben.  Tong is red at tip.    TCM Diagnosis/Assessment: Channel obstruction with LR ping    Plan of Care:  Name of Herbal Formula:None  Dosage:NA  Duration: NA    Acupuncture Therapy:  1st set: Bilateral kilpatrick men, yin luo  2nd set: RT SJ 5- SJ 11 estim,  LT ST 38, GB 34  3rd set: RT LR 3, 8, LT PC 6    Patient Goals: Resolve elbow pain and reduce anxiety by 50%    Face Time: 28 minutes  Total Time: 50 minutes    Plan 2 tx per week for 1 month and re-evaluate

## 2025-07-29 ENCOUNTER — OFFICE VISIT (OUTPATIENT)
Dept: PAIN CLINIC | Facility: CLINIC | Age: 45
End: 2025-07-29
Payer: COMMERCIAL

## 2025-07-29 DIAGNOSIS — M25.521 RIGHT ELBOW PAIN: Primary | ICD-10-CM

## 2025-07-29 PROCEDURE — 97811 ACUP 1/> W/O ESTIM EA ADD 15: CPT | Performed by: ACUPUNCTURIST

## 2025-07-29 PROCEDURE — 97810 ACUP 1/> WO ESTIM 1ST 15 MIN: CPT | Performed by: ACUPUNCTURIST

## 2025-08-01 ENCOUNTER — OFFICE VISIT (OUTPATIENT)
Dept: INTEGRATIVE MEDICINE | Facility: CLINIC | Age: 45
End: 2025-08-01

## 2025-08-01 DIAGNOSIS — M25.511 RIGHT SHOULDER PAIN, UNSPECIFIED CHRONICITY: ICD-10-CM

## 2025-08-01 DIAGNOSIS — M25.521 RIGHT ELBOW PAIN: Primary | ICD-10-CM

## 2025-08-01 PROCEDURE — 97810 ACUP 1/> WO ESTIM 1ST 15 MIN: CPT | Performed by: ACUPUNCTURIST

## 2025-08-01 PROCEDURE — 97811 ACUP 1/> W/O ESTIM EA ADD 15: CPT | Performed by: ACUPUNCTURIST

## 2025-08-05 ENCOUNTER — OFFICE VISIT (OUTPATIENT)
Dept: PAIN CLINIC | Facility: CLINIC | Age: 45
End: 2025-08-05

## 2025-08-05 DIAGNOSIS — M25.521 RIGHT ELBOW PAIN: Primary | ICD-10-CM

## 2025-08-05 PROCEDURE — 97810 ACUP 1/> WO ESTIM 1ST 15 MIN: CPT | Performed by: ACUPUNCTURIST

## 2025-08-05 PROCEDURE — 97811 ACUP 1/> W/O ESTIM EA ADD 15: CPT | Performed by: ACUPUNCTURIST

## 2025-08-07 ENCOUNTER — OFFICE VISIT (OUTPATIENT)
Dept: INTEGRATIVE MEDICINE | Facility: CLINIC | Age: 45
End: 2025-08-07

## 2025-08-07 DIAGNOSIS — M25.521 RIGHT ELBOW PAIN: Primary | ICD-10-CM

## 2025-08-07 PROCEDURE — 97810 ACUP 1/> WO ESTIM 1ST 15 MIN: CPT | Performed by: ACUPUNCTURIST

## 2025-08-07 PROCEDURE — 97811 ACUP 1/> W/O ESTIM EA ADD 15: CPT | Performed by: ACUPUNCTURIST

## 2025-08-12 ENCOUNTER — OFFICE VISIT (OUTPATIENT)
Dept: PAIN CLINIC | Facility: CLINIC | Age: 45
End: 2025-08-12

## 2025-08-12 DIAGNOSIS — M25.511 CHRONIC RIGHT SHOULDER PAIN: Primary | ICD-10-CM

## 2025-08-12 DIAGNOSIS — G89.29 CHRONIC RIGHT SHOULDER PAIN: Primary | ICD-10-CM

## 2025-08-12 PROCEDURE — 97811 ACUP 1/> W/O ESTIM EA ADD 15: CPT | Performed by: ACUPUNCTURIST

## 2025-08-12 PROCEDURE — 97810 ACUP 1/> WO ESTIM 1ST 15 MIN: CPT | Performed by: ACUPUNCTURIST

## 2025-08-14 ENCOUNTER — OFFICE VISIT (OUTPATIENT)
Dept: INTEGRATIVE MEDICINE | Facility: CLINIC | Age: 45
End: 2025-08-14

## 2025-08-14 DIAGNOSIS — M25.521 RIGHT ELBOW PAIN: Primary | ICD-10-CM

## 2025-08-14 PROCEDURE — 97810 ACUP 1/> WO ESTIM 1ST 15 MIN: CPT | Performed by: ACUPUNCTURIST

## 2025-08-14 PROCEDURE — 97811 ACUP 1/> W/O ESTIM EA ADD 15: CPT | Performed by: ACUPUNCTURIST

## 2025-08-18 ENCOUNTER — OFFICE VISIT (OUTPATIENT)
Dept: INTEGRATIVE MEDICINE | Facility: CLINIC | Age: 45
End: 2025-08-18

## 2025-08-18 DIAGNOSIS — M25.521 RIGHT ELBOW PAIN: Primary | ICD-10-CM

## 2025-08-18 PROCEDURE — 97810 ACUP 1/> WO ESTIM 1ST 15 MIN: CPT | Performed by: ACUPUNCTURIST

## 2025-08-18 PROCEDURE — 97811 ACUP 1/> W/O ESTIM EA ADD 15: CPT | Performed by: ACUPUNCTURIST

## 2025-08-26 ENCOUNTER — OFFICE VISIT (OUTPATIENT)
Dept: PAIN CLINIC | Facility: CLINIC | Age: 45
End: 2025-08-26

## 2025-08-26 DIAGNOSIS — M25.521 RIGHT ELBOW PAIN: Primary | ICD-10-CM

## 2025-08-26 PROCEDURE — 97811 ACUP 1/> W/O ESTIM EA ADD 15: CPT | Performed by: ACUPUNCTURIST

## 2025-08-26 PROCEDURE — 97810 ACUP 1/> WO ESTIM 1ST 15 MIN: CPT | Performed by: ACUPUNCTURIST

## 2025-08-29 ENCOUNTER — OFFICE VISIT (OUTPATIENT)
Dept: INTEGRATIVE MEDICINE | Facility: CLINIC | Age: 45
End: 2025-08-29

## 2025-08-29 DIAGNOSIS — M25.521 RIGHT ELBOW PAIN: Primary | ICD-10-CM

## 2025-08-29 PROCEDURE — 97811 ACUP 1/> W/O ESTIM EA ADD 15: CPT | Performed by: ACUPUNCTURIST

## 2025-08-29 PROCEDURE — 97810 ACUP 1/> WO ESTIM 1ST 15 MIN: CPT | Performed by: ACUPUNCTURIST
